# Patient Record
Sex: FEMALE | Race: WHITE | URBAN - METROPOLITAN AREA
[De-identification: names, ages, dates, MRNs, and addresses within clinical notes are randomized per-mention and may not be internally consistent; named-entity substitution may affect disease eponyms.]

---

## 2017-03-31 ENCOUNTER — INITIAL CONSULT (OUTPATIENT)
Dept: SURGERY | Age: 57
End: 2017-03-31

## 2017-03-31 VITALS
HEIGHT: 68 IN | BODY MASS INDEX: 29.55 KG/M2 | SYSTOLIC BLOOD PRESSURE: 123 MMHG | HEART RATE: 84 BPM | DIASTOLIC BLOOD PRESSURE: 85 MMHG | WEIGHT: 195 LBS

## 2017-03-31 DIAGNOSIS — I83.93 VARICOSE VEINS OF BOTH LOWER EXTREMITIES: ICD-10-CM

## 2017-03-31 DIAGNOSIS — I83.93 SPIDER VEINS OF BOTH LOWER EXTREMITIES: Primary | ICD-10-CM

## 2017-03-31 PROCEDURE — 3017F COLORECTAL CA SCREEN DOC REV: CPT | Performed by: SURGERY

## 2017-03-31 PROCEDURE — 1036F TOBACCO NON-USER: CPT | Performed by: SURGERY

## 2017-03-31 PROCEDURE — G8427 DOCREV CUR MEDS BY ELIG CLIN: HCPCS | Performed by: SURGERY

## 2017-03-31 PROCEDURE — G8484 FLU IMMUNIZE NO ADMIN: HCPCS | Performed by: SURGERY

## 2017-03-31 PROCEDURE — 99203 OFFICE O/P NEW LOW 30 MIN: CPT | Performed by: SURGERY

## 2017-03-31 PROCEDURE — 3014F SCREEN MAMMO DOC REV: CPT | Performed by: SURGERY

## 2017-03-31 PROCEDURE — G8419 CALC BMI OUT NRM PARAM NOF/U: HCPCS | Performed by: SURGERY

## 2017-03-31 RX ORDER — CITALOPRAM 20 MG/1
TABLET ORAL
Refills: 3 | COMMUNITY
Start: 2017-03-10

## 2017-03-31 RX ORDER — PRAVASTATIN SODIUM 40 MG
TABLET ORAL
COMMUNITY
Start: 2017-03-06

## 2017-03-31 RX ORDER — LISINOPRIL 20 MG/1
TABLET ORAL
COMMUNITY
Start: 2017-03-08

## 2017-03-31 RX ORDER — OXYBUTYNIN CHLORIDE 15 MG/1
TABLET, EXTENDED RELEASE ORAL
COMMUNITY
Start: 2017-03-08

## 2017-03-31 RX ORDER — SUMATRIPTAN 50 MG/1
TABLET, FILM COATED ORAL
Refills: 0 | COMMUNITY
Start: 2017-01-31

## 2017-03-31 ASSESSMENT — ENCOUNTER SYMPTOMS
RESPIRATORY NEGATIVE: 1
GASTROINTESTINAL NEGATIVE: 1
ALLERGIC/IMMUNOLOGIC NEGATIVE: 1

## 2017-04-03 ENCOUNTER — TELEPHONE (OUTPATIENT)
Dept: SURGERY | Age: 57
End: 2017-04-03

## 2017-04-03 NOTE — TELEPHONE ENCOUNTER
Patient would like to be put on WAIT LIST sclerotherapy - scheduled for 5/5 with Dr. Ankita Brandon    She can come in any Friday before - she only lives 5 mins away    Patient placed on APPOINTMENT DESK wait list

## 2017-04-06 ENCOUNTER — TELEPHONE (OUTPATIENT)
Dept: SURGERY | Age: 57
End: 2017-04-06

## 2017-04-27 ENCOUNTER — TELEPHONE (OUTPATIENT)
Dept: SURGERY | Age: 57
End: 2017-04-27

## 2017-05-05 ENCOUNTER — TELEPHONE (OUTPATIENT)
Dept: SURGERY | Age: 57
End: 2017-05-05

## 2017-06-06 ENCOUNTER — PROCEDURE VISIT (OUTPATIENT)
Dept: SURGERY | Age: 57
End: 2017-06-06

## 2017-06-06 VITALS
WEIGHT: 192 LBS | HEIGHT: 68 IN | DIASTOLIC BLOOD PRESSURE: 78 MMHG | BODY MASS INDEX: 29.1 KG/M2 | SYSTOLIC BLOOD PRESSURE: 124 MMHG

## 2017-06-06 DIAGNOSIS — I83.93 SPIDER VEINS OF BOTH LOWER EXTREMITIES: Primary | ICD-10-CM

## 2017-06-06 PROCEDURE — 99999 PR OFFICE/OUTPT VISIT,PROCEDURE ONLY: CPT | Performed by: SURGERY

## 2017-06-06 ASSESSMENT — ENCOUNTER SYMPTOMS
ALLERGIC/IMMUNOLOGIC NEGATIVE: 1
GASTROINTESTINAL NEGATIVE: 1
RESPIRATORY NEGATIVE: 1
EYES NEGATIVE: 1

## 2017-06-27 ENCOUNTER — OFFICE VISIT (OUTPATIENT)
Dept: SURGERY | Age: 57
End: 2017-06-27

## 2017-06-27 VITALS — SYSTOLIC BLOOD PRESSURE: 112 MMHG | DIASTOLIC BLOOD PRESSURE: 80 MMHG

## 2017-06-27 DIAGNOSIS — I83.93 SPIDER VEINS OF BOTH LOWER EXTREMITIES: ICD-10-CM

## 2017-06-27 DIAGNOSIS — I83.93 VARICOSE VEINS OF BOTH LOWER EXTREMITIES: Primary | ICD-10-CM

## 2017-06-27 PROCEDURE — 99024 POSTOP FOLLOW-UP VISIT: CPT | Performed by: SURGERY

## 2017-06-27 ASSESSMENT — ENCOUNTER SYMPTOMS
RESPIRATORY NEGATIVE: 1
GASTROINTESTINAL NEGATIVE: 1
ALLERGIC/IMMUNOLOGIC NEGATIVE: 1

## 2018-01-26 ENCOUNTER — PROCEDURE VISIT (OUTPATIENT)
Dept: SURGERY | Age: 58
End: 2018-01-26

## 2018-01-26 DIAGNOSIS — I83.93 SPIDER VEINS OF BOTH LOWER EXTREMITIES: Primary | ICD-10-CM

## 2018-01-26 PROCEDURE — 99214 OFFICE O/P EST MOD 30 MIN: CPT | Performed by: SURGERY

## 2018-01-26 ASSESSMENT — ENCOUNTER SYMPTOMS
RESPIRATORY NEGATIVE: 1
GASTROINTESTINAL NEGATIVE: 1
ALLERGIC/IMMUNOLOGIC NEGATIVE: 1

## 2018-01-26 NOTE — PROGRESS NOTES
Subjective:      Patient ID: Constantino Powers is a 62 y.o. female. Chief Complaint   Patient presents with    Procedure     Pt is here today for sclerotherapy. HPI   Patient wishes to discuss Sclerotherapy today for bilateral VV of the LE. Pt states she has seen DR. Ruelas in the past approximately 10 years ago is when she started for sclerotherapy treatments of both her varicose veins as well as the spider veins, stating that her last treatment was with Dr. Enmanuel Castro 5 years ago. . She states that she was actually [facility]. Patient states that she is able to walk as far as she wants without any complications from the LE. She states she does not experience any swelling of the LE, not does she has pain associated with the current veins of the LE. Pt indicates she is unsure of the sclerotherapy solution that has been used in the past procedures she has had, she seems to believe it may be a saline solution. Pt states she has one spider vein of the Right upper thigh region which she indicates that would not take the scleroatrophy solution when she would have the procedures in the past. Pt is not currently taking any blood thinners. Patient has had sclerotherapy preformed on 6/6/2017. She states she has no pain, no ulcers developed. Review of Systems   Constitutional: Negative. HENT: Negative. Eyes: Positive for visual disturbance ( Wears eyeglasses). Respiratory: Negative. Cardiovascular: Negative. Gastrointestinal: Negative. Endocrine: Negative. Genitourinary: Negative. Musculoskeletal: Negative. Skin: Negative. Allergic/Immunologic: Negative. Neurological: Negative. Hematological: Negative. Psychiatric/Behavioral: Negative. Objective:   Physical Exam   Cardiovascular: Normal rate, regular rhythm and S1 normal.  Exam reveals no gallop and no distant heart sounds. No murmur heard.   Pulses:       Carotid pulses are 2+ on the right side, and 2+ on the left side. Radial pulses are 2+ on the right side, and 2+ on the left side. Femoral pulses are 2+ on the right side, and 2+ on the left side. Dorsalis pedis pulses are 2+ on the right side, and 2+ on the left side. Posterior tibial pulses are 1+ on the right side, and 1+ on the left side. Abdominal: She exhibits no shifting dullness, no distension, no pulsatile liver, no fluid wave, no abdominal bruit, no ascites and no mass. There is no hepatosplenomegaly. There is no tenderness. There is no rebound and no CVA tenderness. No hernia. Hernia confirmed negative in the ventral area. Musculoskeletal:        Legs:      Assessment:      No diagnosis found. Plan:       Pt has been advised that she would be required to bring a pair of shorts, along with the surgical stockings 30-40 mmHg thigh high with her to the scheduled sclerotherapy procedure. Pt will call when she is ready to schedule the next sclerotherapy, she is thinking of scheduling in November. Pt is aware the sclerotherapy is going to be $500     Pt is aware the Sclerotherapy solution would have to ordered and once received by our office she will receive a call to schedule the appointment. Pt is also aware of the 45 day window that she has to have the procedure completed. Pt will receive a up to 6 injections at the second sclerotherapy procedure. Sclerotherapy performed on both legs. 4 syringes used, under one hour. Patient tolerated procedure well, no adverse reactions. Cotton balls, tape, and pantyhose/stockings applied. Patient will follow up in 1 month. Rogelio Jay CMA am scribing for and in the presence of Dr Court Andersen on 01/26/18 at 4:36 PM.      MAYKEL Andersen MD personally performed the services described in this documentation as scribed by the Medical Assistant  Bang Beckman  in my presence and it is both accurate and complete.

## 2018-03-16 ENCOUNTER — OFFICE VISIT (OUTPATIENT)
Dept: SURGERY | Age: 58
End: 2018-03-16

## 2018-03-16 VITALS
HEIGHT: 66 IN | HEART RATE: 100 BPM | SYSTOLIC BLOOD PRESSURE: 124 MMHG | WEIGHT: 198.8 LBS | DIASTOLIC BLOOD PRESSURE: 92 MMHG | BODY MASS INDEX: 31.95 KG/M2

## 2018-03-16 DIAGNOSIS — I83.93 SPIDER VEINS OF BOTH LOWER EXTREMITIES: Primary | ICD-10-CM

## 2018-03-16 PROCEDURE — 99024 POSTOP FOLLOW-UP VISIT: CPT | Performed by: SURGERY

## 2018-03-16 ASSESSMENT — ENCOUNTER SYMPTOMS
ALLERGIC/IMMUNOLOGIC NEGATIVE: 1
GASTROINTESTINAL NEGATIVE: 1
RESPIRATORY NEGATIVE: 1

## 2018-03-16 NOTE — PROGRESS NOTES
Constitutional: Negative. HENT: Negative. Eyes: Positive for visual disturbance ( Wears eyeglasses). Respiratory: Negative. Cardiovascular: Negative. Gastrointestinal: Negative. Endocrine: Negative. Genitourinary: Negative. Musculoskeletal: Negative. Skin: Negative. Allergic/Immunologic: Negative. Neurological: Negative. Hematological: Negative. Psychiatric/Behavioral: Negative. Physical Exam   Cardiovascular: Normal rate, regular rhythm and S1 normal.  Exam reveals no gallop and no distant heart sounds. No murmur heard. Pulses:       Carotid pulses are 2+ on the right side, and 2+ on the left side. Radial pulses are 2+ on the right side, and 2+ on the left side. Femoral pulses are 2+ on the right side, and 2+ on the left side. Dorsalis pedis pulses are 2+ on the right side, and 2+ on the left side. Posterior tibial pulses are 1+ on the right side, and 1+ on the left side. Abdominal: She exhibits no shifting dullness, no distension, no pulsatile liver, no fluid wave, no abdominal bruit, no ascites and no mass. There is no hepatosplenomegaly. There is no tenderness. There is no rebound and no CVA tenderness. No hernia. Hernia confirmed negative in the ventral area. Musculoskeletal:        Legs:        ASSESSMENT:    Problem List Items Addressed This Visit     Spider veins of both lower extremities - Primary          PLAN:  Explained to Mr. Rafy Hopkins that the redness she encounter post sclerotherapy 1/26, is normal as a reaction  To the procedure. The discomfort to the left lateral calf she understood will heal over time, the skin appears to be irritated, as this is normal reaction after the injection to the spider veins. She has been advised to apply polysporin to the left calf at the sore spot. She should contact our office should she encounter worsening redness to the bilateral lower extremities.      She plans to follow up in the